# Patient Record
Sex: FEMALE | Race: WHITE | Employment: PART TIME | URBAN - METROPOLITAN AREA
[De-identification: names, ages, dates, MRNs, and addresses within clinical notes are randomized per-mention and may not be internally consistent; named-entity substitution may affect disease eponyms.]

---

## 2018-01-07 ENCOUNTER — HOSPITAL ENCOUNTER (OUTPATIENT)
Age: 33
Setting detail: OBSERVATION
Discharge: HOME | End: 2018-01-08
Attending: OBSTETRICS & GYNECOLOGY | Admitting: OBSTETRICS & GYNECOLOGY
Payer: COMMERCIAL

## 2018-01-07 ENCOUNTER — ANESTHESIA (OUTPATIENT)
Dept: SURGERY | Age: 33
End: 2018-01-07
Payer: COMMERCIAL

## 2018-01-07 ENCOUNTER — ANESTHESIA EVENT (OUTPATIENT)
Dept: SURGERY | Age: 33
End: 2018-01-07
Payer: COMMERCIAL

## 2018-01-07 DIAGNOSIS — Z98.890 STATUS POST LAPAROSCOPY: Primary | ICD-10-CM

## 2018-01-07 PROBLEM — D62 ACUTE BLOOD LOSS ANEMIA: Status: ACTIVE | Noted: 2018-01-07

## 2018-01-07 PROBLEM — O00.90 ECTOPIC PREGNANCY: Status: ACTIVE | Noted: 2018-01-07

## 2018-01-07 PROBLEM — K66.1 HEMOPERITONEUM: Status: ACTIVE | Noted: 2018-01-07

## 2018-01-07 LAB
BASOPHILS # BLD: 0 K/UL (ref 0–0.2)
BASOPHILS NFR BLD: 0 % (ref 0–2)
DIFFERENTIAL METHOD BLD: ABNORMAL
EOSINOPHIL # BLD: 0 K/UL (ref 0–0.8)
EOSINOPHIL NFR BLD: 0 % (ref 0.5–7.8)
ERYTHROCYTE [DISTWIDTH] IN BLOOD BY AUTOMATED COUNT: 13.7 % (ref 11.9–14.6)
ERYTHROCYTE [DISTWIDTH] IN BLOOD BY AUTOMATED COUNT: 14.3 % (ref 11.9–14.6)
HCG SERPL-ACNC: 1781 MIU/ML (ref 0–6)
HCG SERPL-ACNC: 2088 MIU/ML (ref 0–6)
HCG SERPL-ACNC: 3923 MIU/ML (ref 0–6)
HCT VFR BLD AUTO: 21.6 % (ref 35.8–46.3)
HCT VFR BLD AUTO: 26.1 % (ref 35.8–46.3)
HGB BLD-MCNC: 6.9 G/DL (ref 11.7–15.4)
HGB BLD-MCNC: 8.3 G/DL (ref 11.7–15.4)
HGB BLD-MCNC: 8.4 G/DL (ref 11.7–15.4)
IMM GRANULOCYTES # BLD: 0 K/UL (ref 0–0.5)
IMM GRANULOCYTES NFR BLD AUTO: 0 % (ref 0–5)
LYMPHOCYTES # BLD: 0.5 K/UL (ref 0.5–4.6)
LYMPHOCYTES NFR BLD: 4 % (ref 13–44)
MCH RBC QN AUTO: 26 PG (ref 26.1–32.9)
MCH RBC QN AUTO: 26.1 PG (ref 26.1–32.9)
MCHC RBC AUTO-ENTMCNC: 31.9 G/DL (ref 31.4–35)
MCHC RBC AUTO-ENTMCNC: 32.2 G/DL (ref 31.4–35)
MCV RBC AUTO: 80.8 FL (ref 79.6–97.8)
MCV RBC AUTO: 81.8 FL (ref 79.6–97.8)
MONOCYTES # BLD: 0.2 K/UL (ref 0.1–1.3)
MONOCYTES NFR BLD: 2 % (ref 4–12)
NEUTS SEG # BLD: 11.1 K/UL (ref 1.7–8.2)
NEUTS SEG NFR BLD: 94 % (ref 43–78)
PLATELET # BLD AUTO: 235 K/UL (ref 150–450)
PLATELET # BLD AUTO: 260 K/UL (ref 150–450)
PMV BLD AUTO: 9.4 FL (ref 10.8–14.1)
PMV BLD AUTO: 9.5 FL (ref 10.8–14.1)
RBC # BLD AUTO: 2.64 M/UL (ref 4.05–5.25)
RBC # BLD AUTO: 3.23 M/UL (ref 4.05–5.25)
WBC # BLD AUTO: 10.3 K/UL (ref 4.3–11.1)
WBC # BLD AUTO: 11.8 K/UL (ref 4.3–11.1)

## 2018-01-07 PROCEDURE — 74011000250 HC RX REV CODE- 250: Performed by: ANESTHESIOLOGY

## 2018-01-07 PROCEDURE — G0378 HOSPITAL OBSERVATION PER HR: HCPCS

## 2018-01-07 PROCEDURE — 85027 COMPLETE CBC AUTOMATED: CPT | Performed by: OBSTETRICS & GYNECOLOGY

## 2018-01-07 PROCEDURE — 74011250636 HC RX REV CODE- 250/636

## 2018-01-07 PROCEDURE — 77030011502 HC MANIP UTER ZUM ZINN -B: Performed by: OBSTETRICS & GYNECOLOGY

## 2018-01-07 PROCEDURE — 77030035029 HC NDL INSUF VERES DISP COVD -B: Performed by: OBSTETRICS & GYNECOLOGY

## 2018-01-07 PROCEDURE — 74011250636 HC RX REV CODE- 250/636: Performed by: ANESTHESIOLOGY

## 2018-01-07 PROCEDURE — 77030008703 HC TU ET UNCUF COVD -A: Performed by: ANESTHESIOLOGY

## 2018-01-07 PROCEDURE — 36415 COLL VENOUS BLD VENIPUNCTURE: CPT | Performed by: OBSTETRICS & GYNECOLOGY

## 2018-01-07 PROCEDURE — 76010000161 HC OR TIME 1 TO 1.5 HR INTENSV-TIER 1: Performed by: OBSTETRICS & GYNECOLOGY

## 2018-01-07 PROCEDURE — 94760 N-INVAS EAR/PLS OXIMETRY 1: CPT

## 2018-01-07 PROCEDURE — 77030034849: Performed by: OBSTETRICS & GYNECOLOGY

## 2018-01-07 PROCEDURE — 77030008522 HC TBNG INSUF LAPRO STRY -B: Performed by: OBSTETRICS & GYNECOLOGY

## 2018-01-07 PROCEDURE — 77030032490 HC SLV COMPR SCD KNE COVD -B

## 2018-01-07 PROCEDURE — 74011258636 HC RX REV CODE- 258/636: Performed by: OBSTETRICS & GYNECOLOGY

## 2018-01-07 PROCEDURE — 74011250637 HC RX REV CODE- 250/637: Performed by: OBSTETRICS & GYNECOLOGY

## 2018-01-07 PROCEDURE — P9016 RBC LEUKOCYTES REDUCED: HCPCS | Performed by: ANESTHESIOLOGY

## 2018-01-07 PROCEDURE — 36430 TRANSFUSION BLD/BLD COMPNT: CPT

## 2018-01-07 PROCEDURE — 77030019940 HC BLNKT HYPOTHRM STRY -B: Performed by: ANESTHESIOLOGY

## 2018-01-07 PROCEDURE — 85018 HEMOGLOBIN: CPT | Performed by: ANESTHESIOLOGY

## 2018-01-07 PROCEDURE — 77030010507 HC ADH SKN DERMBND J&J -B: Performed by: OBSTETRICS & GYNECOLOGY

## 2018-01-07 PROCEDURE — 86901 BLOOD TYPING SEROLOGIC RH(D): CPT | Performed by: ANESTHESIOLOGY

## 2018-01-07 PROCEDURE — 77030008756 HC TU IRR SUC STRY -B: Performed by: OBSTETRICS & GYNECOLOGY

## 2018-01-07 PROCEDURE — 77030020407 HC IV BLD WRMR ST 3M -A: Performed by: ANESTHESIOLOGY

## 2018-01-07 PROCEDURE — 84702 CHORIONIC GONADOTROPIN TEST: CPT | Performed by: OBSTETRICS & GYNECOLOGY

## 2018-01-07 PROCEDURE — 99218 HC RM OBSERVATION: CPT

## 2018-01-07 PROCEDURE — 75810000275 HC EMERGENCY DEPT VISIT NO LEVEL OF CARE: Performed by: EMERGENCY MEDICINE

## 2018-01-07 PROCEDURE — 74011000250 HC RX REV CODE- 250

## 2018-01-07 PROCEDURE — 77030032490 HC SLV COMPR SCD KNE COVD -B: Performed by: OBSTETRICS & GYNECOLOGY

## 2018-01-07 PROCEDURE — 77030011640 HC PAD GRND REM COVD -A: Performed by: OBSTETRICS & GYNECOLOGY

## 2018-01-07 PROCEDURE — 77010033678 HC OXYGEN DAILY

## 2018-01-07 PROCEDURE — 74011250636 HC RX REV CODE- 250/636: Performed by: OBSTETRICS & GYNECOLOGY

## 2018-01-07 PROCEDURE — 96361 HYDRATE IV INFUSION ADD-ON: CPT | Performed by: EMERGENCY MEDICINE

## 2018-01-07 PROCEDURE — 99285 EMERGENCY DEPT VISIT HI MDM: CPT | Performed by: EMERGENCY MEDICINE

## 2018-01-07 PROCEDURE — 77030018836 HC SOL IRR NACL ICUM -A: Performed by: OBSTETRICS & GYNECOLOGY

## 2018-01-07 PROCEDURE — 77030013131 HC IV BLD ST ICUM -A

## 2018-01-07 PROCEDURE — 76060000034 HC ANESTHESIA 1.5 TO 2 HR: Performed by: OBSTETRICS & GYNECOLOGY

## 2018-01-07 PROCEDURE — 86923 COMPATIBILITY TEST ELECTRIC: CPT | Performed by: ANESTHESIOLOGY

## 2018-01-07 PROCEDURE — 77030008477 HC STYL SATN SLP COVD -A: Performed by: ANESTHESIOLOGY

## 2018-01-07 PROCEDURE — 85025 COMPLETE CBC W/AUTO DIFF WBC: CPT | Performed by: ANESTHESIOLOGY

## 2018-01-07 PROCEDURE — 96374 THER/PROPH/DIAG INJ IV PUSH: CPT | Performed by: EMERGENCY MEDICINE

## 2018-01-07 PROCEDURE — 76210000016 HC OR PH I REC 1 TO 1.5 HR: Performed by: OBSTETRICS & GYNECOLOGY

## 2018-01-07 PROCEDURE — 77030035044 HC TRCR ENDOSC VRSPRT BLDLSS COVD -C: Performed by: OBSTETRICS & GYNECOLOGY

## 2018-01-07 PROCEDURE — 77030010351 HC TRCR ENDOSC VSTP COVD -B: Performed by: OBSTETRICS & GYNECOLOGY

## 2018-01-07 RX ORDER — DEXTROSE, SODIUM CHLORIDE, SODIUM LACTATE, POTASSIUM CHLORIDE, AND CALCIUM CHLORIDE 5; .6; .31; .03; .02 G/100ML; G/100ML; G/100ML; G/100ML; G/100ML
125 INJECTION, SOLUTION INTRAVENOUS CONTINUOUS
Status: DISCONTINUED | OUTPATIENT
Start: 2018-01-07 | End: 2018-01-07

## 2018-01-07 RX ORDER — HYDROMORPHONE HYDROCHLORIDE 2 MG/ML
0.5 INJECTION, SOLUTION INTRAMUSCULAR; INTRAVENOUS; SUBCUTANEOUS
Status: DISCONTINUED | OUTPATIENT
Start: 2018-01-07 | End: 2018-01-07 | Stop reason: HOSPADM

## 2018-01-07 RX ORDER — NEOSTIGMINE METHYLSULFATE 1 MG/ML
INJECTION INTRAVENOUS AS NEEDED
Status: DISCONTINUED | OUTPATIENT
Start: 2018-01-07 | End: 2018-01-07 | Stop reason: HOSPADM

## 2018-01-07 RX ORDER — DEXTROSE, SODIUM CHLORIDE, SODIUM LACTATE, POTASSIUM CHLORIDE, AND CALCIUM CHLORIDE 5; .6; .31; .03; .02 G/100ML; G/100ML; G/100ML; G/100ML; G/100ML
75 INJECTION, SOLUTION INTRAVENOUS CONTINUOUS
Status: DISCONTINUED | OUTPATIENT
Start: 2018-01-07 | End: 2018-01-08 | Stop reason: HOSPADM

## 2018-01-07 RX ORDER — SODIUM CHLORIDE 9 MG/ML
INJECTION, SOLUTION INTRAVENOUS
Status: DISCONTINUED | OUTPATIENT
Start: 2018-01-07 | End: 2018-01-07 | Stop reason: HOSPADM

## 2018-01-07 RX ORDER — FAMOTIDINE 10 MG/ML
INJECTION INTRAVENOUS
Status: DISCONTINUED
Start: 2018-01-07 | End: 2018-01-07

## 2018-01-07 RX ORDER — ONDANSETRON 2 MG/ML
4 INJECTION INTRAMUSCULAR; INTRAVENOUS
Status: DISCONTINUED | OUTPATIENT
Start: 2018-01-07 | End: 2018-01-08 | Stop reason: HOSPADM

## 2018-01-07 RX ORDER — ROCURONIUM BROMIDE 10 MG/ML
INJECTION, SOLUTION INTRAVENOUS AS NEEDED
Status: DISCONTINUED | OUTPATIENT
Start: 2018-01-07 | End: 2018-01-07 | Stop reason: HOSPADM

## 2018-01-07 RX ORDER — SODIUM CHLORIDE 9 MG/ML
250 INJECTION, SOLUTION INTRAVENOUS AS NEEDED
Status: DISCONTINUED | OUTPATIENT
Start: 2018-01-07 | End: 2018-01-08 | Stop reason: HOSPADM

## 2018-01-07 RX ORDER — OXYCODONE HYDROCHLORIDE 5 MG/1
5 TABLET ORAL
Status: DISCONTINUED | OUTPATIENT
Start: 2018-01-07 | End: 2018-01-07 | Stop reason: HOSPADM

## 2018-01-07 RX ORDER — SODIUM CHLORIDE 0.9 % (FLUSH) 0.9 %
5-10 SYRINGE (ML) INJECTION EVERY 8 HOURS
Status: DISCONTINUED | OUTPATIENT
Start: 2018-01-07 | End: 2018-01-08 | Stop reason: HOSPADM

## 2018-01-07 RX ORDER — DEXAMETHASONE SODIUM PHOSPHATE 4 MG/ML
INJECTION, SOLUTION INTRA-ARTICULAR; INTRALESIONAL; INTRAMUSCULAR; INTRAVENOUS; SOFT TISSUE AS NEEDED
Status: DISCONTINUED | OUTPATIENT
Start: 2018-01-07 | End: 2018-01-07 | Stop reason: HOSPADM

## 2018-01-07 RX ORDER — PROPOFOL 10 MG/ML
INJECTION, EMULSION INTRAVENOUS AS NEEDED
Status: DISCONTINUED | OUTPATIENT
Start: 2018-01-07 | End: 2018-01-07 | Stop reason: HOSPADM

## 2018-01-07 RX ORDER — SODIUM CHLORIDE 9 MG/ML
250 INJECTION, SOLUTION INTRAVENOUS AS NEEDED
Status: DISCONTINUED | OUTPATIENT
Start: 2018-01-07 | End: 2018-01-07

## 2018-01-07 RX ORDER — SODIUM CHLORIDE, SODIUM LACTATE, POTASSIUM CHLORIDE, CALCIUM CHLORIDE 600; 310; 30; 20 MG/100ML; MG/100ML; MG/100ML; MG/100ML
125 INJECTION, SOLUTION INTRAVENOUS CONTINUOUS
Status: DISCONTINUED | OUTPATIENT
Start: 2018-01-07 | End: 2018-01-07

## 2018-01-07 RX ORDER — HYDROMORPHONE HYDROCHLORIDE 2 MG/ML
1 INJECTION, SOLUTION INTRAMUSCULAR; INTRAVENOUS; SUBCUTANEOUS
Status: DISCONTINUED | OUTPATIENT
Start: 2018-01-07 | End: 2018-01-08 | Stop reason: HOSPADM

## 2018-01-07 RX ORDER — IBUPROFEN 400 MG/1
800 TABLET ORAL
Status: DISCONTINUED | OUTPATIENT
Start: 2018-01-07 | End: 2018-01-08 | Stop reason: HOSPADM

## 2018-01-07 RX ORDER — NALOXONE HYDROCHLORIDE 0.4 MG/ML
0.4 INJECTION, SOLUTION INTRAMUSCULAR; INTRAVENOUS; SUBCUTANEOUS AS NEEDED
Status: DISCONTINUED | OUTPATIENT
Start: 2018-01-07 | End: 2018-01-08 | Stop reason: HOSPADM

## 2018-01-07 RX ORDER — SODIUM CHLORIDE 0.9 % (FLUSH) 0.9 %
5-10 SYRINGE (ML) INJECTION AS NEEDED
Status: DISCONTINUED | OUTPATIENT
Start: 2018-01-07 | End: 2018-01-08 | Stop reason: HOSPADM

## 2018-01-07 RX ORDER — FENTANYL CITRATE 50 UG/ML
INJECTION, SOLUTION INTRAMUSCULAR; INTRAVENOUS AS NEEDED
Status: DISCONTINUED | OUTPATIENT
Start: 2018-01-07 | End: 2018-01-07 | Stop reason: HOSPADM

## 2018-01-07 RX ORDER — FAMOTIDINE 10 MG/ML
20 INJECTION INTRAVENOUS
Status: COMPLETED | OUTPATIENT
Start: 2018-01-07 | End: 2018-01-07

## 2018-01-07 RX ORDER — DIPHENHYDRAMINE HCL 25 MG
50 CAPSULE ORAL
Status: COMPLETED | OUTPATIENT
Start: 2018-01-07 | End: 2018-01-07

## 2018-01-07 RX ORDER — GLYCOPYRROLATE 0.2 MG/ML
INJECTION INTRAMUSCULAR; INTRAVENOUS AS NEEDED
Status: DISCONTINUED | OUTPATIENT
Start: 2018-01-07 | End: 2018-01-07 | Stop reason: HOSPADM

## 2018-01-07 RX ORDER — LIDOCAINE HYDROCHLORIDE 20 MG/ML
INJECTION, SOLUTION EPIDURAL; INFILTRATION; INTRACAUDAL; PERINEURAL AS NEEDED
Status: DISCONTINUED | OUTPATIENT
Start: 2018-01-07 | End: 2018-01-07 | Stop reason: HOSPADM

## 2018-01-07 RX ORDER — OXYCODONE HYDROCHLORIDE 5 MG/1
10 TABLET ORAL
Status: DISCONTINUED | OUTPATIENT
Start: 2018-01-07 | End: 2018-01-07 | Stop reason: HOSPADM

## 2018-01-07 RX ORDER — SODIUM CHLORIDE, SODIUM LACTATE, POTASSIUM CHLORIDE, CALCIUM CHLORIDE 600; 310; 30; 20 MG/100ML; MG/100ML; MG/100ML; MG/100ML
1000 INJECTION, SOLUTION INTRAVENOUS ONCE
Status: COMPLETED | OUTPATIENT
Start: 2018-01-07 | End: 2018-01-07

## 2018-01-07 RX ORDER — ONDANSETRON 2 MG/ML
INJECTION INTRAMUSCULAR; INTRAVENOUS AS NEEDED
Status: DISCONTINUED | OUTPATIENT
Start: 2018-01-07 | End: 2018-01-07 | Stop reason: HOSPADM

## 2018-01-07 RX ORDER — ACETAMINOPHEN 500 MG
1000 TABLET ORAL ONCE
Status: COMPLETED | OUTPATIENT
Start: 2018-01-07 | End: 2018-01-07

## 2018-01-07 RX ORDER — SODIUM CHLORIDE, SODIUM LACTATE, POTASSIUM CHLORIDE, CALCIUM CHLORIDE 600; 310; 30; 20 MG/100ML; MG/100ML; MG/100ML; MG/100ML
INJECTION, SOLUTION INTRAVENOUS
Status: DISCONTINUED | OUTPATIENT
Start: 2018-01-07 | End: 2018-01-07

## 2018-01-07 RX ORDER — SODIUM CHLORIDE, SODIUM LACTATE, POTASSIUM CHLORIDE, CALCIUM CHLORIDE 600; 310; 30; 20 MG/100ML; MG/100ML; MG/100ML; MG/100ML
INJECTION, SOLUTION INTRAVENOUS
Status: DISCONTINUED | OUTPATIENT
Start: 2018-01-07 | End: 2018-01-07 | Stop reason: HOSPADM

## 2018-01-07 RX ORDER — SODIUM CHLORIDE, SODIUM LACTATE, POTASSIUM CHLORIDE, CALCIUM CHLORIDE 600; 310; 30; 20 MG/100ML; MG/100ML; MG/100ML; MG/100ML
75 INJECTION, SOLUTION INTRAVENOUS CONTINUOUS
Status: DISCONTINUED | OUTPATIENT
Start: 2018-01-07 | End: 2018-01-07 | Stop reason: HOSPADM

## 2018-01-07 RX ORDER — FAMOTIDINE 10 MG/ML
20 INJECTION INTRAVENOUS
Status: DISCONTINUED | OUTPATIENT
Start: 2018-01-07 | End: 2018-01-07

## 2018-01-07 RX ORDER — HYDROCODONE BITARTRATE AND ACETAMINOPHEN 7.5; 325 MG/1; MG/1
1-2 TABLET ORAL
Status: DISCONTINUED | OUTPATIENT
Start: 2018-01-07 | End: 2018-01-08 | Stop reason: HOSPADM

## 2018-01-07 RX ADMIN — SODIUM CHLORIDE, SODIUM LACTATE, POTASSIUM CHLORIDE, AND CALCIUM CHLORIDE 1000 ML: 600; 310; 30; 20 INJECTION, SOLUTION INTRAVENOUS at 01:08

## 2018-01-07 RX ADMIN — Medication 5 ML: at 05:57

## 2018-01-07 RX ADMIN — SODIUM CHLORIDE, SODIUM LACTATE, POTASSIUM CHLORIDE, CALCIUM CHLORIDE, AND DEXTROSE MONOHYDRATE 125 ML/HR: 600; 310; 30; 20; 5 INJECTION, SOLUTION INTRAVENOUS at 05:57

## 2018-01-07 RX ADMIN — ROCURONIUM BROMIDE 10 MG: 10 INJECTION, SOLUTION INTRAVENOUS at 02:14

## 2018-01-07 RX ADMIN — DEXAMETHASONE SODIUM PHOSPHATE 8 MG: 4 INJECTION, SOLUTION INTRA-ARTICULAR; INTRALESIONAL; INTRAMUSCULAR; INTRAVENOUS; SOFT TISSUE at 01:45

## 2018-01-07 RX ADMIN — GLYCOPYRROLATE 0.4 MG: 0.2 INJECTION INTRAMUSCULAR; INTRAVENOUS at 02:32

## 2018-01-07 RX ADMIN — ACETAMINOPHEN 1000 MG: 500 TABLET, FILM COATED ORAL at 22:37

## 2018-01-07 RX ADMIN — ROCURONIUM BROMIDE 5 MG: 10 INJECTION, SOLUTION INTRAVENOUS at 02:00

## 2018-01-07 RX ADMIN — HYDROCODONE BITARTRATE AND ACETAMINOPHEN 1 TABLET: 7.5; 325 TABLET ORAL at 21:43

## 2018-01-07 RX ADMIN — FENTANYL CITRATE 50 MCG: 50 INJECTION, SOLUTION INTRAMUSCULAR; INTRAVENOUS at 01:25

## 2018-01-07 RX ADMIN — NEOSTIGMINE METHYLSULFATE 2 MG: 1 INJECTION INTRAVENOUS at 02:32

## 2018-01-07 RX ADMIN — SODIUM CHLORIDE: 9 INJECTION, SOLUTION INTRAVENOUS at 01:18

## 2018-01-07 RX ADMIN — FAMOTIDINE 20 MG: 10 INJECTION, SOLUTION INTRAVENOUS at 00:53

## 2018-01-07 RX ADMIN — HYDROMORPHONE HYDROCHLORIDE 0.5 MG: 2 INJECTION, SOLUTION INTRAMUSCULAR; INTRAVENOUS; SUBCUTANEOUS at 03:26

## 2018-01-07 RX ADMIN — LIDOCAINE HYDROCHLORIDE 100 MG: 20 INJECTION, SOLUTION EPIDURAL; INFILTRATION; INTRACAUDAL; PERINEURAL at 01:25

## 2018-01-07 RX ADMIN — FENTANYL CITRATE 50 MCG: 50 INJECTION, SOLUTION INTRAMUSCULAR; INTRAVENOUS at 02:30

## 2018-01-07 RX ADMIN — ROCURONIUM BROMIDE 15 MG: 10 INJECTION, SOLUTION INTRAVENOUS at 01:34

## 2018-01-07 RX ADMIN — ROCURONIUM BROMIDE 5 MG: 10 INJECTION, SOLUTION INTRAVENOUS at 01:25

## 2018-01-07 RX ADMIN — ONDANSETRON 4 MG: 2 INJECTION INTRAMUSCULAR; INTRAVENOUS at 01:51

## 2018-01-07 RX ADMIN — ONDANSETRON 4 MG: 2 INJECTION INTRAMUSCULAR; INTRAVENOUS at 08:44

## 2018-01-07 RX ADMIN — HYDROCODONE BITARTRATE AND ACETAMINOPHEN 1 TABLET: 7.5; 325 TABLET ORAL at 15:52

## 2018-01-07 RX ADMIN — FENTANYL CITRATE 50 MCG: 50 INJECTION, SOLUTION INTRAMUSCULAR; INTRAVENOUS at 01:34

## 2018-01-07 RX ADMIN — PROPOFOL 150 MG: 10 INJECTION, EMULSION INTRAVENOUS at 01:25

## 2018-01-07 RX ADMIN — HYDROMORPHONE HYDROCHLORIDE 1 MG: 2 INJECTION, SOLUTION INTRAMUSCULAR; INTRAVENOUS; SUBCUTANEOUS at 08:44

## 2018-01-07 RX ADMIN — FENTANYL CITRATE 50 MCG: 50 INJECTION, SOLUTION INTRAMUSCULAR; INTRAVENOUS at 02:46

## 2018-01-07 RX ADMIN — DIPHENHYDRAMINE HYDROCHLORIDE 50 MG: 25 CAPSULE ORAL at 22:38

## 2018-01-07 RX ADMIN — SODIUM CHLORIDE, SODIUM LACTATE, POTASSIUM CHLORIDE, CALCIUM CHLORIDE, AND DEXTROSE MONOHYDRATE 125 ML/HR: 600; 310; 30; 20; 5 INJECTION, SOLUTION INTRAVENOUS at 15:52

## 2018-01-07 RX ADMIN — SODIUM CHLORIDE, SODIUM LACTATE, POTASSIUM CHLORIDE, CALCIUM CHLORIDE: 600; 310; 30; 20 INJECTION, SOLUTION INTRAVENOUS at 01:45

## 2018-01-07 RX ADMIN — SODIUM CHLORIDE, SODIUM LACTATE, POTASSIUM CHLORIDE, AND CALCIUM CHLORIDE: 600; 310; 30; 20 INJECTION, SOLUTION INTRAVENOUS at 01:18

## 2018-01-07 NOTE — BRIEF OP NOTE
BRIEF OPERATIVE NOTE    Date of Procedure: 1/7/2018   Preoperative Diagnosis: Tubal ectopic pregnancy, unspecified laterality, unspecified whether intrauterine pregnancy present [O00.109]  Postoperative Diagnosis: Tubal ectopic pregnancy, unspecified laterality, unspecified whether intrauterine pregnancy present [O00.109]    Procedure(s):  DIAGNOSTIC LAPAROSCOPY FOR REMOVAL OF CLOT    Surgeon(s) and Role:      * Yari Huang MD - Primary         Assistant Staff:none  intraop consult with OB hospitalist       Surgical Staff:  Circ-1: Karan Butler RN  Scrub Tech-1: Tiara Orona  Scrub Tech-2: Erica Early  Event Time In   Incision Start 7497   Incision Close 0237     Anesthesia: General   Estimated Blood Loss: 1200cc blood in abdomen  Specimens: * No specimens in log *   Findings: large hemoperitoneum, surgically absent L tube, no obvious tubal or ovarian pregnancy   Complications: uncertain location of ectopic  Implants: * No implants in log *

## 2018-01-07 NOTE — PROGRESS NOTES
Resting comfortably. Only has some abd pain when takes a deep breath. Requests water. VS  At least 200cc clear urine in france bag  Abdomen soft, NT, ND  hgb stable at 8.3  Quant here 3923    Continue to watch closely.  Matti quant later today

## 2018-01-07 NOTE — ANESTHESIA PREPROCEDURE EVALUATION
Anesthetic History     PONV          Review of Systems / Medical History  Patient summary reviewed and pertinent labs reviewed    Pulmonary                   Neuro/Psych              Cardiovascular                  Exercise tolerance: >4 METS     GI/Hepatic/Renal                Endo/Other        Anemia     Other Findings   Comments: Ruptured ectopic           Physical Exam    Airway  Mallampati: I  TM Distance: > 6 cm  Neck ROM: normal range of motion   Mouth opening: Normal     Cardiovascular  Regular rate and rhythm,  S1 and S2 normal,  no murmur, click, rub, or gallop    Rate: normal         Dental  No notable dental hx       Pulmonary  Breath sounds clear to auscultation               Abdominal         Other Findings            Anesthetic Plan    ASA: 2, emergent  Anesthesia type: general            Anesthetic plan and risks discussed with: Patient and Spouse

## 2018-01-07 NOTE — PERIOP NOTES
TRANSFER - OUT REPORT:    Verbal report given to Xiao RN(name) on Edwin Hassan  being transferred to Room 339(unit) for routine post - op       Report consisted of patients Situation, Background, Assessment and   Recommendations(SBAR). Information from the following report(s) SBAR, Kardex, OR Summary, Procedure Summary, Intake/Output and MAR was reviewed with the receiving nurse. Opportunity for questions and clarification was provided.       Patient transported with:   O2 @ 3 liters  Tech

## 2018-01-07 NOTE — PERIOP NOTES
TRANSFER - IN REPORT:    Verbal report received from Gordon Pepe RN on Jed Phillips  being received from ER for urgent transfer      Report consisted of patients Situation, Background, Assessment and   Recommendations(SBAR). Information from the following report(s) SBAR, ED Summary, Intake/Output and MAR was reviewed with the receiving nurse. Opportunity for questions and clarification was provided. Assessment completed upon patients arrival to unit and care assumed.      PATIENT WAS STABLE WITH BP /64

## 2018-01-07 NOTE — OP NOTES
DIAGNOSTIC LAPAROSCOPY      Lucio Scheuermann  [id number]  1/7/2018        PRE-OP DX: early pregnancy with R adnexal mass, acute abdomen    POST-OP DX:  Hemoperitoneum, acute blood loss anemia, no active bleeding, uncertain location of ectopic    PROCEDURE:  DIAGNOSTIC LAPAROSCOPY and evacuation of hemoperitoneum      SURGEON:  Darling    EBL:   5cc from laparoscopy. 1200cc hemoperitoneum present    SPECIMEN:none      PROCEDURE:    The patient was placed on the operating room table in the supine position. Time out was done to confirm operative procedure, surgeon, patient and site. Once this had been confirmed the procedure was started. The patient was placed under general endotracheal anesthesia repositioned in the dorsal lithotomy position prepped and draped in the usual sterile fashion for vaginal/laparoscopic surgery. On EUA, the uterus was retroverted. No adnexal masses palpated. A weighted speculum was placed in the vagina and the cervix was visualized. The cervix was grasped with a single tooth tennaculum, momin cannula was inserted into the cervix and attached to the tennaculum. This was used  for uterine manipulation. A subumbilical incision was made. A verses needle was inserted, opening pressure of 3mm Hg noted,  and the abdomen was filled with 2 liters of CO2. A 5mm trocar was inserted through the incision followed by introduction of the laparoscope. Under direct laparoscopic vision a 10mm trocar was placed lateral to the rectus muscle on the patients left side. The same was done with a 5mm trocar on the right side. Patient was placed in Trendelenburg. Large amount clot and fresh blood noted in the pelvis and upper abdomen. Large suction  was placed through the 10mm port and used to suction as much blood/clot as possible away. The uterus appeared normal. Left tube surgically absent.  Left ovary normal. Small filmy adhesion of left ovary to pelvic side wall. No ectopic evident on the left ovary. Organized clot in the pelvis was concentrated between the right tube and ovary. But no mass c/w ectopic was seen here. There was no distension of the right tube. Atraumatic grasper was placed in the R lateral port to manipulate and elevate the right tube and ovary, to look at all aspects of the adnexa. Boudreaux Moose was replaced with HUMI to enable better manipulation and visualization. No ectopic was seen. Both ovaries were inspected several times. No uterine cornual distension noted either. Smaller tip was placed on the suction , and as much blood as possible was suctioned from the right upper quadrant. Patient was placed in reverse trendelenburg to help with this. Once back in trendelenburg, some dark blood was still present in the left upper quandrant. This was observed several times over the last 15 minutes of the case, and was not increasing. Dr Barbara Soler was asked to consult intraop. She also visualized the pelvis and helped evaluate the stability of the blood present in the left upper quadrant. The omentum and bowel were visualized several times as well. No masses or bleeding areas were seen. At this point, I broke scrub to examine the clots and material in the sock of the suction canister. No material was seen suggestive of the pregnancy. New sterile gloves were donned. Scope was replaced in the abdomen. There was no bleeding in the pelvis at all. The blood in the left upper quadrant was the same in amount as prior. Dr Abdi Moore also felt this was the case. The decision was made to not remove what looked like a normal tube in this patient who desires fertility. The 5mm and 10mm lateral trocars were removed under direct laparoscopic vision and Hemostasis was insured. All counts were correct. CO2 was allowed to escape. Umbilical port was removed.   The trocar incision sites were closed with 4-0 Vicryl and mini pressure dressing were applied to each trocar site.     The Rigo Doctor was removed from the cervix, hemostasis was ensured. The patient tolerated the procedure well was awaken from GETA and sent to the PACU in stable condition. She is being admitted to med-surg to watch carefully and follow VSCristina, scarlettb's.

## 2018-01-07 NOTE — PROGRESS NOTES
Patient alert, oriented with family at bedside. Nursing assessment completed. Bed in low and locked position. Patient instructed to call for assistance, if needed. No acute distress noted. SCD's on and IV fluids infusing as ordered.

## 2018-01-07 NOTE — IP AVS SNAPSHOT
[address] 
[phone] Patient: Gilberto Hunter MRN: [id number] :1985 About your hospitalization You were admitted on:  2018 You last received care in the:  [facility] You were discharged on:  2018 Why you were hospitalized Your primary diagnosis was:  Ectopic Pregnancy Your diagnoses also included:  Acute Blood Loss Anemia, Hemoperitoneum Follow-up Information Follow up With Details Comments Contact Info MD Kimmy Benavides [place] [address] [address] 
[phone] Antwan Fields MD On 2018 1:30pm [address] [address] 
[phone] Your Scheduled Appointments   1:30 PM EST Global Post Op with Antwan Fields MD  
[facility] ([facility]) [address] [id number] [phone] 2018 10:30 AM EST  
[facility]ician with Antwan Fields MD, [facility]) [address] [address] [phone] Discharge Orders None A check kamaljit indicates which time of day the medication should be taken. My Medications START taking these medications Instructions Each Dose to Equal  
 Morning Noon Evening Bedtime HYDROcodone-acetaminophen 7.5-325 mg per tablet Commonly known as:  [place] Take 1-2 Tabs by mouth every six (6) hours as needed. Max Daily Amount: 8 Tabs. 1-2 Tab  
    
   
   
   
  
 ibuprofen 800 mg tablet Commonly known as:  MOTRIN Take 1 Tab by mouth every eight (8) hours as needed. 800 mg CONTINUE taking these medications Instructions Each Dose to Equal  
 Morning Noon Evening Bedtime terconazole 0.4 % vaginal cream  
Commonly known as:  TERAZOL 7 Your next dose is:  TODAY Insert 1 Applicator into vagina nightly. 1 Applicator Where to Get Your Medications These medications were sent to [facility]/pharmacy #[id number]- [address], [address] [facility] [address] [place]  [address], [address] [place] [address] Phone:  [phone]  
  ibuprofen 800 mg tablet Information on where to get these meds will be given to you by the nurse or doctor. ! Ask your nurse or doctor about these medications HYDROcodone-acetaminophen 7.5-325 mg per tablet Discharge Instructions DISCHARGE SUMMARY from Nurse The following personal items are in your possession at time of discharge: 
 
Dental Appliances: None Visual Aid: None Home Medications: None Jewelry: Dorita Woodrowuel Clothing: Pants, Shirt, Undergarments, Jacket/Coat Other Valuables: Cell Phone PATIENT INSTRUCTIONS: 
 
After general anesthesia or intravenous sedation, for 24 hours or while taking prescription Narcotics: · Limit your activities · Do not drive and operate hazardous machinery · Do not make important personal or business decisions · Do  not drink alcoholic beverages · If you have not urinated within 8 hours after discharge, please contact your surgeon on call. Report the following to your surgeon: 
· Excessive pain, swelling, redness or odor of or around the surgical area · Temperature over 100.5 · Nausea and vomiting lasting longer than 4 hours or if unable to take medications · Any signs of decreased circulation or nerve impairment to extremity: change in color, persistent  numbness, tingling, coldness or increase pain · Any questions What to do at Home: 
Recommended activity: Activity as tolerated, per MD 
 
If you experience any of the following symptoms fever>101, pain unrelieved with medication, nausea/vomiting, shortness of breath, dizziness/fainting, chest pain. , please follow up with your doctor. *  Please give a list of your current medications to your Primary Care Provider. *  Please update this list whenever your medications are discontinued, doses are 
    changed, or new medications (including over-the-counter products) are added. *  Please carry medication information at all times in case of emergency situations. These are general instructions for a healthy lifestyle: No smoking/ No tobacco products/ Avoid exposure to second hand smoke Surgeon General's Warning:  Quitting smoking now greatly reduces serious risk to your health. Obesity, smoking, and sedentary lifestyle greatly increases your risk for illness A healthy diet, regular physical exercise & weight monitoring are important for maintaining a healthy lifestyle You may be retaining fluid if you have a history of heart failure or if you experience any of the following symptoms:  Weight gain of 3 pounds or more overnight or 5 pounds in a week, increased swelling in our hands or feet or shortness of breath while lying flat in bed. Please call your doctor as soon as you notice any of these symptoms; do not wait until your next office visit. Recognize signs and symptoms of STROKE: 
 
F-face looks uneven A-arms unable to move or move unevenly S-speech slurred or non-existent T-time-call 911 as soon as signs and symptoms begin-DO NOT go Back to bed or wait to see if you get better-TIME IS BRAIN. Warning Signs of HEART ATTACK Call 911 if you have these symptoms: 
? Chest discomfort. Most heart attacks involve discomfort in the center of the chest that lasts more than a few minutes, or that goes away and comes back. It can feel like uncomfortable pressure, squeezing, fullness, or pain. ? Discomfort in other areas of the upper body.  Symptoms can include pain or discomfort in one or both arms, the back, neck, jaw, or stomach. ? Shortness of breath with or without chest discomfort. ? Other signs may include breaking out in a cold sweat, nausea, or lightheadedness. Don't wait more than five minutes to call [address]! Fast action can save your life. Calling 911 is almost always the fastest way to get lifesaving treatment. Emergency Medical Services staff can begin treatment when they arrive  up to an hour sooner than if someone gets to the hospital by car. The discharge information has been reviewed with the patient. The patient verbalized understanding. Discharge medications reviewed with the patient and appropriate educational materials and side effects teaching were provided. Ectopic Pregnancy: Care Instructions Your Care Instructions An ectopic pregnancy occurs when a fertilized egg grows outside of the uterus. In a normal pregnancy, the fertilized egg grows inside the uterus. In most ectopic pregnancies, the egg grows in a fallopian tube. This is also called a tubal pregnancy. Sometimes the egg grows in an ovary or another place in the belly. But this is rare. An ectopic pregnancy never becomes a normal pregnancy and birth. You had treatment to end your ectopic pregnancy. This was done to prevent dangerous problems. You may need a few weeks to recover if you had surgery. You should be able to have a normal pregnancy in the future. But you may have a higher risk for more ectopic pregnancies. Tell your doctor right away if you get pregnant again. Follow-up care is a key part of your treatment and safety. Be sure to make and go to all appointments, and call your doctor if you are having problems. It's also a good idea to know your test results and keep a list of the medicines you take. How can you care for yourself at home?  
· After your treatment, you may have vaginal bleeding that's similar to a period. It may last for up to a week. Use pads instead of tampons. You may use tampons during your next period. It should start in 3 to 6 weeks. · Do not have sex until after the bleeding stops. · If you are treated with methotrexate: 
¨ Your doctor will let you know if you can take over-the-counter pain medicine, such as acetaminophen (Tylenol), ibuprofen (Advil, Motrin), or naproxen (Aleve). Read and follow all instructions on the label. ¨ Do not take two or more pain medicines at the same time unless the doctor told you to. Many pain medicines have acetaminophen, which is Tylenol. Too much acetaminophen (Tylenol) can be harmful. ¨ Do not drink alcohol. ¨ Do not take vitamins that contain folic acid, such as prenatal vitamins. · Get plenty of rest. You may be more tired than normal for a few weeks. · Take it easy and avoid lifting until your doctor tells you it is safe to do your normal activities. · Give yourself and your partner time to grieve. You may have feelings of loss. You may wonder why it happened and blame yourself. ¨ Talking to family members, friends, or a counselor may help you cope with your loss. ¨ If you feel sad for longer than 2 weeks, tell your doctor or a counselor. · Talk to your doctor if you are worried about having children in the future. Most doctors suggest waiting until you have had at least one normal period before you try to get pregnant. · If you do not want to get pregnant, ask your doctor about birth control. You can get pregnant again before your next period starts. When should you call for help? Call 911 anytime you think you may need emergency care. For example, call if: 
· You passed out (lost consciousness). Call your doctor now or seek immediate medical care if: 
· You have severe vaginal bleeding. · You are dizzy or lightheaded, or you feel like you may faint. · You have a fever. · You have new or worse pain in your belly or pelvis. · You have vaginal discharge that smells bad. Watch closely for changes in your health, and be sure to contact your doctor if: 
· You do not get better as expected. Where can you learn more? Go to http://roshan-madhuri.info/. Enter J774 in the search box to learn more about \"Ectopic Pregnancy: Care Instructions. \" Current as of: March 16, 2017 Content Version: 11.4 © 1088-9689 [employer]. Care instructions adapted under license by [employer] (which disclaims liability or warranty for this information). If you have questions about a medical condition or this instruction, always ask your healthcare professional. [address] any warranty or liability for your use of this information. [employer] Announcement We are excited to announce that we are making your provider's discharge notes available to you in BloomBoard. You will see these notes when they are completed and signed by the physician that discharged you from your recent hospital stay. If you have any questions or concerns about any information you see in BloomBoard, please call the Health Information Department where you were seen or reach out to your Primary Care Provider for more information about your plan of care. Introducing [facility]! [employer] introduces [employer]Board patient portal. Now you can access parts of your medical record, email your doctor's office, and request medication refills online. 1. In your internet browser, go to [email or web address] 2. Click on the First Time User? Click Here link in the Sign In box. You will see the New Member Sign Up page. 3. Enter your BloomBoard Access Code exactly as it appears below. You will not need to use this code after youve completed the sign-up process. If you do not sign up before the expiration date, you must request a new code.  
 
· BloomBoard Access Code: [email or web address] 
 Expires: 2/12/2018  2:24 PM 
 
4. Enter the last four digits of your Social Security Number (xxxx) and Date of Birth (mm/dd/yyyy) as indicated and click Submit. You will be taken to the next sign-up page. 5. Create a This Week Int ID. This will be your [employer] login ID and cannot be changed, so think of one that is secure and easy to remember. 6. Create a [employer] password. You can change your password at any time. 7. Enter your Password Reset Question and Answer. This can be used at a later time if you forget your password. 8. Enter your e-mail address. You will receive e-mail notification when new information is available in [address] 9. Click Sign Up. You can now view and download portions of your medical record. 10. Click the Download Summary menu link to download a portable copy of your medical information. If you have questions, please visit the Frequently Asked Questions section of the [employer] website. Remember, [employer] is NOT to be used for urgent needs. For medical emergencies, dial 911. Now available from your iPhone and Android! Providers Seen During Your Hospitalization Provider Specialty Primary office phone Anais Peace MD Obstetrics & Gynecology [phone] Your Primary Care Physician (PCP) Primary Care Physician Office Phone Office Fax Shanti Baljitlouisa, [address] [place] [address] [phone] You are allergic to the following No active allergies Recent Documentation Height Weight Breastfeeding? BMI OB Status Smoking Status 1.676 m 63.5 kg No 22.6 kg/m2 Pregnant Never Smoker Emergency Contacts Name Discharge Info Relation Home Work Mobile [facility]  Spouse [3]   [phone] Patient Belongings  The following personal items are in your possession at time of discharge: 
  Dental Appliances: None  Visual Aid: None      Home Medications: None   Jewelry: Earrings, Ring  Clothing: Pants, Shirt, Undergarments, Jacket/Coat    Other Valuables: [employer] Please provide this summary of care documentation to your next provider. Signatures-by signing, you are acknowledging that this After Visit Summary has been reviewed with you and you have received a copy. Patient Signature:  ____________________________________________________________ Date:  ____________________________________________________________  
  
Erasmo November Provider Signature:  ____________________________________________________________ Date:  ____________________________________________________________

## 2018-01-07 NOTE — ANESTHESIA POSTPROCEDURE EVALUATION
Post-Anesthesia Evaluation and Assessment    Patient: Antwan Weinberg MRN: [id number]  SSN: [id number]    YOB: 1985  Age: 33 y.o. Sex: female       Cardiovascular Function/Vital Signs  Visit Vitals    BP 96/53    Pulse 69    Temp 37.2 °C (98.9 °F)    Resp 20    Ht 5' 6\" (1.676 m)    Wt 63.5 kg (140 lb)    SpO2 100%    BMI 22.6 kg/m2       Patient is status post general anesthesia for Procedure(s):  DIAGNOSTIC LAPAROSCOPY FOR REMOVAL OF CLOT  . Nausea/Vomiting: None    Postoperative hydration reviewed and adequate. Pain:  Pain Scale 1: Visual (01/07/18 0350)  Pain Intensity 1: 0 (01/07/18 0350)   Managed    Neurological Status:   Neuro (WDL): Exceptions to WDL (01/07/18 0250)  Neuro  Neurologic State: Eyes open to voice (01/07/18 0350)  Cognition: Follows commands (01/07/18 0335)  LUE Motor Response: Purposeful (01/07/18 0335)  LLE Motor Response: Purposeful (01/07/18 0335)  RUE Motor Response: Purposeful (01/07/18 0335)  RLE Motor Response: Purposeful (01/07/18 0335)   At baseline    Mental Status and Level of Consciousness: Arousable    Pulmonary Status:   O2 Device: Nasal cannula (01/07/18 0335)   Adequate oxygenation and airway patent    Complications related to anesthesia: None    Post-anesthesia assessment completed.  No concerns    Signed By: Melecio Molina MD     January 7, 2018

## 2018-01-07 NOTE — PROGRESS NOTES
Unable to ID location of ectopic. Pt  6w2d by lmp 11-24  Quant 1895  Will observe in house: quants, hgb's, vs    D/w pt and  that she may need additional surgery if begins bleeding again. Would need laparotomy if that occurs.

## 2018-01-07 NOTE — PROGRESS NOTES
TRANSFER - IN REPORT:    Verbal report received from VIMAL alexander(name) on Ruthie Palmer  being received from PACU for routine post - op      Report consisted of patients Situation, Background, Assessment and   Recommendations(SBAR). Information from the following report(s) Kardex, OR Summary, Intake/Output and Recent Results was reviewed with the receiving nurse. Opportunity for questions and clarification was provided. Assessment completed upon patients arrival to unit and care assumed.

## 2018-01-07 NOTE — ED TRIAGE NOTES
Pt presents to ER via EMS from [facility] for Dr. Levi Meza to see due to ruptured ectopic pregnancy., Dr. Zhanna Ulrich and Dr. Levi Meza currently at [facility], [address] team present.

## 2018-01-07 NOTE — PROGRESS NOTES
Patient ate baked potato and salad. Tolerated well. Requests for pain medication 4/10 cramping pain in abdomen. PRN norco administered. 2500 clear yellow urine emptied from france catheter.

## 2018-01-07 NOTE — H&P
2018      DeWitt Cross  1985      32 y. o.[id number] seen at [facility] with early pregnancy (7wks by lmp) and abdominal pain which started around [facility] reported to me by Dr Ana Nobles as >3000. US shows no iup, FF in upper abdomen, mass in R adnexa. PMH:infertility, pelvic adhesions    PSH:laparoscopy with removal L tube      Current Facility-Administered Medications:     lactated Ringers infusion, 125 mL/hr, IntraVENous, CONTINUOUS, SAMUEL Allen MD    famotidine (PF) (PEPCID) injection 20 mg, 20 mg, IntraVENous, NOW, SAMUEL Allen MD    famotidine (PF) (PEPCID) 20 mg/2 mL injection, , , ,     Current Outpatient Prescriptions:     terconazole (TERAZOL 7) 0.4 % vaginal cream, Insert 1 Applicator into vagina nightly., Disp: 45 g, Rfl: 0    Family and social history noncontributory. Review of systems: pertinent items noted above     Visit Vitals    BP (!) 86/49 (BP 1 Location: Right arm, BP Patient Position: At rest;Supine)    Pulse 71    Temp 98.7 °F (37.1 °C)    Resp 16    Ht 5' 6\" (1.676 m)    Wt 144 lb (65.3 kg)    LMP 10/25/2017    SpO2 96%    BMI 23.24 kg/m2     Patient in no distress. Well developed, well nourished. A&O x3. HEENT: normocephalic, nontraumatic, sclerae nonicteric. Pt pale  Neck: supple, trachea midline  Chest: respiratory effort normal. Lungs clear to auscultation  CV: regular rate and rhythm. No murmur or gallop  Abdomen: soft, globally tender  Pelvic: not performed    Labs from [facility] reviewed. hgb earlier 11, plts nl    Imaging: see above    Assessment/Plan: ruptured ectopic pregnancy. Proceed with laparoscopy if possible. D/w pt she will lose R tube most likely. Reviewed the risks of surgery including anesthesia, bleeding, transfusion, infection,  injury to vessels/nerves/ureters/bowel/bladder.

## 2018-01-08 VITALS
OXYGEN SATURATION: 100 % | WEIGHT: 140 LBS | BODY MASS INDEX: 22.5 KG/M2 | DIASTOLIC BLOOD PRESSURE: 62 MMHG | RESPIRATION RATE: 16 BRPM | TEMPERATURE: 98.3 F | HEART RATE: 92 BPM | HEIGHT: 66 IN | SYSTOLIC BLOOD PRESSURE: 96 MMHG

## 2018-01-08 LAB
ABO + RH BLD: NORMAL
BLD PROD TYP BPU: NORMAL
BLOOD BANK CMNT PATIENT-IMP: NORMAL
BLOOD GROUP ANTIBODIES SERPL: NORMAL
BPU ID: NORMAL
CROSSMATCH RESULT,%XM: NORMAL
HCT VFR BLD AUTO: 28.4 % (ref 35.8–46.3)
HGB BLD-MCNC: 9.2 G/DL (ref 11.7–15.4)
SPECIMEN EXP DATE BLD: NORMAL
STATUS OF UNIT,%ST: NORMAL
UNIT DIVISION, %UDIV: 0

## 2018-01-08 PROCEDURE — 36415 COLL VENOUS BLD VENIPUNCTURE: CPT | Performed by: OBSTETRICS & GYNECOLOGY

## 2018-01-08 PROCEDURE — G0378 HOSPITAL OBSERVATION PER HR: HCPCS

## 2018-01-08 PROCEDURE — 74011250637 HC RX REV CODE- 250/637: Performed by: OBSTETRICS & GYNECOLOGY

## 2018-01-08 PROCEDURE — 85014 HEMATOCRIT: CPT | Performed by: OBSTETRICS & GYNECOLOGY

## 2018-01-08 PROCEDURE — 36430 TRANSFUSION BLD/BLD COMPNT: CPT

## 2018-01-08 PROCEDURE — 99218 HC RM OBSERVATION: CPT

## 2018-01-08 PROCEDURE — 86900 BLOOD TYPING SEROLOGIC ABO: CPT | Performed by: OBSTETRICS & GYNECOLOGY

## 2018-01-08 PROCEDURE — 86923 COMPATIBILITY TEST ELECTRIC: CPT | Performed by: OBSTETRICS & GYNECOLOGY

## 2018-01-08 PROCEDURE — P9016 RBC LEUKOCYTES REDUCED: HCPCS | Performed by: OBSTETRICS & GYNECOLOGY

## 2018-01-08 RX ORDER — IBUPROFEN 800 MG/1
800 TABLET ORAL
Qty: 60 TAB | Refills: 0 | Status: SHIPPED | OUTPATIENT
Start: 2018-01-08 | End: 2018-07-12

## 2018-01-08 RX ORDER — HYDROCODONE BITARTRATE AND ACETAMINOPHEN 7.5; 325 MG/1; MG/1
1-2 TABLET ORAL
Qty: 30 TAB | Refills: 0 | Status: SHIPPED | OUTPATIENT
Start: 2018-01-08 | End: 2018-01-11

## 2018-01-08 RX ORDER — SODIUM CHLORIDE 9 MG/ML
250 INJECTION, SOLUTION INTRAVENOUS AS NEEDED
Status: DISCONTINUED | OUTPATIENT
Start: 2018-01-08 | End: 2018-01-08 | Stop reason: HOSPADM

## 2018-01-08 RX ADMIN — HYDROCODONE BITARTRATE AND ACETAMINOPHEN 1 TABLET: 7.5; 325 TABLET ORAL at 13:53

## 2018-01-08 RX ADMIN — HYDROCODONE BITARTRATE AND ACETAMINOPHEN 1 TABLET: 7.5; 325 TABLET ORAL at 06:08

## 2018-01-08 NOTE — PROGRESS NOTES
Awake,alert,,pain level=1-2/10 as stated,abdomen soft but tender to touch,,has three bandaid dressings noted,,intact,clean and dry,,second unit PRBC infusing well so far on left AC space site,,vital signs monitored. Tani Jews Tani Jews Tani Jews Tani Jews

## 2018-01-08 NOTE — PROGRESS NOTES
OBG/GYN Generic Progress Note    Patient doing well. Tolerating reg diet. Feels well. Vitals:  Blood pressure 90/50, pulse 95, temperature 98.7 °F (37.1 °C), resp. rate 18, height 5' 6\" (1.676 m), weight 140 lb (63.5 kg), last menstrual period 10/25/2017, SpO2 100 %, not currently breastfeeding. Temp (24hrs), Av.8 °F (37.1 °C), Min:97.8 °F (36.6 °C), Max:99.5 °F (37.5 °C)        Exam:  Patient without distress. Abdomen soft,  nontender. Incisions dry and clean without erythema. Labs:   Recent Results (from the past 24 hour(s))   CBC WITH AUTOMATED DIFF    Collection Time: 18  1:04 AM   Result Value Ref Range    WBC 11.8 (H) 4.3 - 11.1 K/uL    RBC 3.23 (L) 4.05 - 5.25 M/uL    HGB 8.4 (L) 11.7 - 15.4 g/dL    HCT 26.1 (L) 35.8 - 46.3 %    MCV 80.8 79.6 - 97.8 FL    MCH 26.0 (L) 26.1 - 32.9 PG    MCHC 32.2 31.4 - 35.0 g/dL    RDW 13.7 11.9 - 14.6 %    PLATELET 306 443 - 413 K/uL    MPV 9.5 (L) 10.8 - 14.1 FL    DF AUTOMATED      NEUTROPHILS 94 (H) 43 - 78 %    LYMPHOCYTES 4 (L) 13 - 44 %    MONOCYTES 2 (L) 4.0 - 12.0 %    EOSINOPHILS 0 (L) 0.5 - 7.8 %    BASOPHILS 0 0.0 - 2.0 %    IMMATURE GRANULOCYTES 0 0.0 - 5.0 %    ABS. NEUTROPHILS 11.1 (H) 1.7 - 8.2 K/UL    ABS. LYMPHOCYTES 0.5 0.5 - 4.6 K/UL    ABS. MONOCYTES 0.2 0.1 - 1.3 K/UL    ABS. EOSINOPHILS 0.0 0.0 - 0.8 K/UL    ABS. BASOPHILS 0.0 0.0 - 0.2 K/UL    ABS. IMM.  GRANS. 0.0 0.0 - 0.5 K/UL   TYPE + CROSSMATCH    Collection Time: 18  1:04 AM   Result Value Ref Range    Crossmatch Expiration 01/10/2018     ABO/Rh(D) O POSITIVE     Antibody screen NEG     Unit number [id number]     Blood component type [facility] AS5     Unit division 00     Status of unit ALLOCATED     Crossmatch result Compatible     Unit number [id number]     Blood component type [facility] AS5     Unit division 00     Status of unit ALLOCATED     Crossmatch result Compatible    HEMOGLOBIN    Collection Time: 01/07/18  3:14 AM   Result Value Ref Range    HGB 8.3 (L) 11.7 - 15.4 g/dL   BETA HCG, QT    Collection Time: 01/07/18  3:14 AM   Result Value Ref Range    Beta HCG, QT 3923 (H) 0.0 - 6.0 MIU/ML   BETA HCG, QT    Collection Time: 01/07/18  7:42 AM   Result Value Ref Range    Beta HCG, QT 2088 (H) 0.0 - 6.0 MIU/ML   CBC W/O DIFF    Collection Time: 01/07/18  4:20 PM   Result Value Ref Range    WBC 10.3 4.3 - 11.1 K/uL    RBC 2.64 (L) 4.05 - 5.25 M/uL    HGB 6.9 (LL) 11.7 - 15.4 g/dL    HCT 21.6 (L) 35.8 - 46.3 %    MCV 81.8 79.6 - 97.8 FL    MCH 26.1 26.1 - 32.9 PG    MCHC 31.9 31.4 - 35.0 g/dL    RDW 14.3 11.9 - 14.6 %    PLATELET 375 340 - 866 K/uL    MPV 9.4 (L) 10.8 - 14.1 FL   BETA HCG, QT    Collection Time: 01/07/18  4:20 PM   Result Value Ref Range    Beta HCG, QT 1781 (H) 0.0 - 6.0 MIU/ML       Assessment and Plan:  Barron Pretty continues to drop. hgb now equilibrating <7. D/w pt poss blood transfusion. D/c france and ambulate and see how she tolerates the anemia. D/w the small risks transfusion vs the real risk of significant problems if pt has any viable poc in situ and she starts hemorrhaging again from now a hgb of 6.9. She will decide.

## 2018-01-08 NOTE — DISCHARGE INSTRUCTIONS
DISCHARGE SUMMARY from Nurse    The following personal items are in your possession at time of discharge:    Dental Appliances: None  Visual Aid: None     Home Medications: None  Jewelry: Earrings, Ring  Clothing: Pants, Shirt, Undergarments, Jacket/Coat  Other Valuables: Cell Phone             PATIENT INSTRUCTIONS:    After general anesthesia or intravenous sedation, for 24 hours or while taking prescription Narcotics:  · Limit your activities  · Do not drive and operate hazardous machinery  · Do not make important personal or business decisions  · Do  not drink alcoholic beverages  · If you have not urinated within 8 hours after discharge, please contact your surgeon on call. Report the following to your surgeon:  · Excessive pain, swelling, redness or odor of or around the surgical area  · Temperature over 100.5  · Nausea and vomiting lasting longer than 4 hours or if unable to take medications  · Any signs of decreased circulation or nerve impairment to extremity: change in color, persistent  numbness, tingling, coldness or increase pain  · Any questions        What to do at Home:  Recommended activity: Activity as tolerated, per MD    If you experience any of the following symptoms fever>101, pain unrelieved with medication, nausea/vomiting, shortness of breath, dizziness/fainting, chest pain. , please follow up with your doctor. *  Please give a list of your current medications to your Primary Care Provider. *  Please update this list whenever your medications are discontinued, doses are      changed, or new medications (including over-the-counter products) are added. *  Please carry medication information at all times in case of emergency situations. These are general instructions for a healthy lifestyle:    No smoking/ No tobacco products/ Avoid exposure to second hand smoke    Surgeon General's Warning:  Quitting smoking now greatly reduces serious risk to your health.     Obesity, smoking, and sedentary lifestyle greatly increases your risk for illness    A healthy diet, regular physical exercise & weight monitoring are important for maintaining a healthy lifestyle    You may be retaining fluid if you have a history of heart failure or if you experience any of the following symptoms:  Weight gain of 3 pounds or more overnight or 5 pounds in a week, increased swelling in our hands or feet or shortness of breath while lying flat in bed. Please call your doctor as soon as you notice any of these symptoms; do not wait until your next office visit. Recognize signs and symptoms of STROKE:    F-face looks uneven    A-arms unable to move or move unevenly    S-speech slurred or non-existent    T-time-call 911 as soon as signs and symptoms begin-DO NOT go       Back to bed or wait to see if you get better-TIME IS BRAIN. Warning Signs of HEART ATTACK     Call 911 if you have these symptoms:   Chest discomfort. Most heart attacks involve discomfort in the center of the chest that lasts more than a few minutes, or that goes away and comes back. It can feel like uncomfortable pressure, squeezing, fullness, or pain.  Discomfort in other areas of the upper body. Symptoms can include pain or discomfort in one or both arms, the back, neck, jaw, or stomach.  Shortness of breath with or without chest discomfort.  Other signs may include breaking out in a cold sweat, nausea, or lightheadedness. Don't wait more than five minutes to call 911 - MINUTES MATTER! Fast action can save your life. Calling 911 is almost always the fastest way to get lifesaving treatment. Emergency Medical Services staff can begin treatment when they arrive -- up to an hour sooner than if someone gets to the hospital by car. The discharge information has been reviewed with the patient. The patient verbalized understanding.     Discharge medications reviewed with the patient and appropriate educational materials and side effects teaching were provided. Ectopic Pregnancy: Care Instructions  Your Care Instructions  An ectopic pregnancy occurs when a fertilized egg grows outside of the uterus. In a normal pregnancy, the fertilized egg grows inside the uterus. In most ectopic pregnancies, the egg grows in a fallopian tube. This is also called a tubal pregnancy. Sometimes the egg grows in an ovary or another place in the belly. But this is rare. An ectopic pregnancy never becomes a normal pregnancy and birth. You had treatment to end your ectopic pregnancy. This was done to prevent dangerous problems. You may need a few weeks to recover if you had surgery. You should be able to have a normal pregnancy in the future. But you may have a higher risk for more ectopic pregnancies. Tell your doctor right away if you get pregnant again. Follow-up care is a key part of your treatment and safety. Be sure to make and go to all appointments, and call your doctor if you are having problems. It's also a good idea to know your test results and keep a list of the medicines you take. How can you care for yourself at home? · After your treatment, you may have vaginal bleeding that's similar to a period. It may last for up to a week. Use pads instead of tampons. You may use tampons during your next period. It should start in 3 to 6 weeks. · Do not have sex until after the bleeding stops. · If you are treated with methotrexate:  ¨ Your doctor will let you know if you can take over-the-counter pain medicine, such as acetaminophen (Tylenol), ibuprofen (Advil, Motrin), or naproxen (Aleve). Read and follow all instructions on the label. ¨ Do not take two or more pain medicines at the same time unless the doctor told you to. Many pain medicines have acetaminophen, which is Tylenol. Too much acetaminophen (Tylenol) can be harmful. ¨ Do not drink alcohol. ¨ Do not take vitamins that contain folic acid, such as prenatal vitamins.   · Get plenty of rest. You may be more tired than normal for a few weeks. · Take it easy and avoid lifting until your doctor tells you it is safe to do your normal activities. · Give yourself and your partner time to grieve. You may have feelings of loss. You may wonder why it happened and blame yourself. ¨ Talking to family members, friends, or a counselor may help you cope with your loss. ¨ If you feel sad for longer than 2 weeks, tell your doctor or a counselor. · Talk to your doctor if you are worried about having children in the future. Most doctors suggest waiting until you have had at least one normal period before you try to get pregnant. · If you do not want to get pregnant, ask your doctor about birth control. You can get pregnant again before your next period starts. When should you call for help? Call 911 anytime you think you may need emergency care. For example, call if:  · You passed out (lost consciousness). Call your doctor now or seek immediate medical care if:  · You have severe vaginal bleeding. · You are dizzy or lightheaded, or you feel like you may faint. · You have a fever. · You have new or worse pain in your belly or pelvis. · You have vaginal discharge that smells bad. Watch closely for changes in your health, and be sure to contact your doctor if:  · You do not get better as expected. Where can you learn more? Go to http://roshan-madhuri.info/. Enter J774 in the search box to learn more about \"Ectopic Pregnancy: Care Instructions. \"  Current as of: March 16, 2017  Content Version: 11.4  © 3811-4529 [employer]. Care instructions adapted under license by [employer] (which disclaims liability or warranty for this information). If you have questions about a medical condition or this instruction, always ask your healthcare professional. [address] any warranty or liability for your use of this information.

## 2018-01-08 NOTE — PROGRESS NOTES
Second unit of PRBC all infused,vital signs checked,,no complaints of pain so far. .. [place] [place] [employer]

## 2018-01-08 NOTE — PROGRESS NOTES
Second unit of PRBC still infusing,, in to see patient,,orders made,,blood transfusion in progress,,no complaints of post-op.pain,,up to the bathroom for needs,,assisted by PCT and voided. ...

## 2018-01-08 NOTE — PROGRESS NOTES
Patient resting quietly, alert and oriented, no distress noted. Abdominal dressings x 3 c/d/i. IV fluids infusing as ordered @ 75 ml/hr, france cathetar has been removed for clear, yellow urine. Patient has been up ambulating with nurse and family in hallway as per doctor order, tolerating well. Neurovascular and peripheral vascular checks WNL. Bed low and locked position. Call light within reach. Patient instructed to call for assistance, verbalizes understanding. Nursing assessment complete.

## 2018-01-08 NOTE — PROGRESS NOTES
Discharge instructions and prescriptions provided and explained to the pt. Med side effect sheet reviewed. Opportunity for questions provided. Pt is finishing with her transfusion. Instructed to call once ready to leave.

## 2018-01-08 NOTE — PROGRESS NOTES
OBG/GYN Generic Progress Note    Patient had dizziness this morning ambulating, even after 1 unit rbc's transfused. Now getting 2nd unit. Pain controlled on current medication. Voiding without difficulty. Patient  passing Flatus. Vitals:  Blood pressure 95/42, pulse 71, temperature 98.2 °F (36.8 °C), resp. rate 16, height 5' 6\" (1.676 m), weight 140 lb (63.5 kg), last menstrual period 10/25/2017, SpO2 98 %, not currently breastfeeding. Temp (24hrs), Av.9 °F (37.2 °C), Min:97.7 °F (36.5 °C), Max:99.7 °F (37.6 °C)        Exam:  Patient without distress. Abdomen soft,  nontender. Incisions dry and clean without erythema. Labs:   Recent Results (from the past 24 hour(s))   CBC W/O DIFF    Collection Time: 18  4:20 PM   Result Value Ref Range    WBC 10.3 4.3 - 11.1 K/uL    RBC 2.64 (L) 4.05 - 5.25 M/uL    HGB 6.9 (LL) 11.7 - 15.4 g/dL    HCT 21.6 (L) 35.8 - 46.3 %    MCV 81.8 79.6 - 97.8 FL    MCH 26.1 26.1 - 32.9 PG    MCHC 31.9 31.4 - 35.0 g/dL    RDW 14.3 11.9 - 14.6 %    PLATELET 430 437 - 245 K/uL    MPV 9.4 (L) 10.8 - 14.1 FL   BETA HCG, QT    Collection Time: 18  4:20 PM   Result Value Ref Range    Beta HCG, QT 1781 (H) 0.0 - 6.0 MIU/ML   TYPE & SCREEN    Collection Time: 18  5:25 AM   Result Value Ref Range    Crossmatch Expiration 2018     ABO/Rh(D) Abbie Maldonado POSITIVE     Antibody screen NEG     Unit number [id number]     Blood component type RC LR AS5     Unit division 00     Status of unit ISSUED     Crossmatch result Compatible        Assessment and Plan:  D/c home after 2nd unit.  Will ck hgb after 2nd unit in

## 2018-01-08 NOTE — DISCHARGE SUMMARY
Discharge Summary     Name: Sharmon Boxer MRN: [id number]  SSN: [id number]    YOB: 1985  Age: 33 y.o. Sex: female      Admit Date: 1/7/2018    Discharge Date: 1/8/2018      Admitting Physician: Milli Louis MD     * Admission Diagnoses: Ectopic Pregnancy    * Discharge Diagnoses:   Hospital Problems as of 1/8/2018  Date Reviewed: 11/15/2017          Codes Class Noted - Resolved POA    Acute blood loss anemia ICD-10-CM: D62  ICD-9-CM: 285.1  1/7/2018 - Present Yes        Hemoperitoneum ICD-10-CM: K66.1  ICD-9-CM: 568.81  1/7/2018 - Present Yes        * (Principal)Ectopic pregnancy ICD-10-CM: O00.90  ICD-9-CM: 633.90  1/7/2018 - Present Yes               * Procedures: Laparoscopy    * Discharge Condition: [facility] Course: Normal hospital course for this procedure. Hemoperitoneum was evacuated. No ectopic was found, but quants dropped.  Pt required transfusion    Significant Diagnostic Studies:   Recent Results (from the past 24 hour(s))   CBC W/O DIFF    Collection Time: 01/07/18  4:20 PM   Result Value Ref Range    WBC 10.3 4.3 - 11.1 K/uL    RBC 2.64 (L) 4.05 - 5.25 M/uL    HGB 6.9 (LL) 11.7 - 15.4 g/dL    HCT 21.6 (L) 35.8 - 46.3 %    MCV 81.8 79.6 - 97.8 FL    MCH 26.1 26.1 - 32.9 PG    MCHC 31.9 31.4 - 35.0 g/dL    RDW 14.3 11.9 - 14.6 %    PLATELET 753 128 - 325 K/uL    MPV 9.4 (L) 10.8 - 14.1 FL   BETA HCG, QT    Collection Time: 01/07/18  4:20 PM   Result Value Ref Range    Beta HCG, QT 1781 (H) 0.0 - 6.0 MIU/ML   TYPE & SCREEN    Collection Time: 01/08/18  5:25 AM   Result Value Ref Range    Crossmatch Expiration 01/11/2018     ABO/Rh(D) Mohan Andersons POSITIVE     Antibody screen NEG     Unit number [id number]     Blood component type RC LR AS5     Unit division 00     Status of unit ISSUED     Crossmatch result Compatible        * Disposition: Home    Discharge Medications:   Current Discharge Medication List      START taking these medications    Details HYDROcodone-acetaminophen (NORCO) 7.5-325 mg per tablet Take 1-2 Tabs by mouth every six (6) hours as needed. Max Daily Amount: 8 Tabs. Qty: 30 Tab, Refills: 0    Associated Diagnoses: Status post laparoscopy      ibuprofen (MOTRIN) 800 mg tablet Take 1 Tab by mouth every eight (8) hours as needed. Qty: 60 Tab, Refills: 0         CONTINUE these medications which have NOT CHANGED    Details   terconazole (TERAZOL 7) 0.4 % vaginal cream Insert 1 Applicator into vagina nightly. Qty: 45 g, Refills: 0              * Follow-up Care/Patient Instructions: Activity: No sex, douching, or tampons for 6 weeks or as directed by your physician. No heavy lifting for 6 weeks. No driving while taking pain medication.   Diet: Resume pre-hospital diet  Wound Care: Keep wound clean and dry    Follow-up Information     Follow up With Details Comments [facility] MD Tony   [address] [place] [id number] [phone]             Signed By:  Kiana Moyer MD     January 8, 2018

## 2018-01-09 LAB
ABO + RH BLD: NORMAL
BLD PROD TYP BPU: NORMAL
BLOOD GROUP ANTIBODIES SERPL: NORMAL
BPU ID: NORMAL
CROSSMATCH RESULT,%XM: NORMAL
SPECIMEN EXP DATE BLD: NORMAL
STATUS OF UNIT,%ST: NORMAL
UNIT DIVISION, %UDIV: 0

## 2018-01-09 NOTE — PROGRESS NOTES
Up and about in room,,was given one tablet Norco (7.5 mgs. ),prn for pain,,Hgb.level post post PRBC transfusion=9.2,, IV access (x2) taken out both AC spce,,abdominal dressing intact,clean and dry X3 bandaids. ...... Ivan Cohens

## 2018-01-09 NOTE — PROGRESS NOTES
Discharged per wheel chair,,stable condition,,accompanied by her ,,discharge instructions given by Med-Surg. RN with prescription given to patient. ...... Clara Flannery

## 2018-08-15 NOTE — PROGRESS NOTES
OBG/GYN Generic Progress Note    Patient doing well DOS laparoscopy with evacuation of blood/clots. Unable to ID the ectopic pregnancy. No significant complaints. Pain controlled on current medication. C/o thirst and hunger    Vitals:  Blood pressure 93/52, pulse 91, temperature 99.5 °F (37.5 °C), resp. rate 18, height 5' 6\" (1.676 m), weight 140 lb (63.5 kg), last menstrual period 10/25/2017, SpO2 99 %, not currently breastfeeding. Temp (24hrs), Av.8 °F (37.1 °C), Min:97.8 °F (36.6 °C), Max:99.5 °F (37.5 °C)        Exam:  Patient without distress. Abdomen soft,  nontender. Incisions have dry bandaids in place               Colin bag with ~400cc clear urine          Labs:   Recent Results (from the past 24 hour(s))   CBC WITH AUTOMATED DIFF    Collection Time: 18  1:04 AM   Result Value Ref Range    WBC 11.8 (H) 4.3 - 11.1 K/uL    RBC 3.23 (L) 4.05 - 5.25 M/uL    HGB 8.4 (L) 11.7 - 15.4 g/dL    HCT 26.1 (L) 35.8 - 46.3 %    MCV 80.8 79.6 - 97.8 FL    MCH 26.0 (L) 26.1 - 32.9 PG    MCHC 32.2 31.4 - 35.0 g/dL    RDW 13.7 11.9 - 14.6 %    PLATELET 389 073 - 494 K/uL    MPV 9.5 (L) 10.8 - 14.1 FL    DF AUTOMATED      NEUTROPHILS 94 (H) 43 - 78 %    LYMPHOCYTES 4 (L) 13 - 44 %    MONOCYTES 2 (L) 4.0 - 12.0 %    EOSINOPHILS 0 (L) 0.5 - 7.8 %    BASOPHILS 0 0.0 - 2.0 %    IMMATURE GRANULOCYTES 0 0.0 - 5.0 %    ABS. NEUTROPHILS 11.1 (H) 1.7 - 8.2 K/UL    ABS. LYMPHOCYTES 0.5 0.5 - 4.6 K/UL    ABS. MONOCYTES 0.2 0.1 - 1.3 K/UL    ABS. EOSINOPHILS 0.0 0.0 - 0.8 K/UL    ABS. BASOPHILS 0.0 0.0 - 0.2 K/UL    ABS. IMM.  GRANS. 0.0 0.0 - 0.5 K/UL   TYPE + CROSSMATCH    Collection Time: 18  1:04 AM   Result Value Ref Range    Crossmatch Expiration 01/10/2018     ABO/Rh(D) O POSITIVE     Antibody screen NEG     Unit number [id number]     Blood component type RC LR AS5     Unit division 00     Status of unit ALLOCATED     Crossmatch result Compatible Unit number [id number]     Blood component type RC LR AS5     Unit division 00     Status of unit ALLOCATED     Crossmatch result Compatible    HEMOGLOBIN    Collection Time: 01/07/18  3:14 AM   Result Value Ref Range    HGB 8.3 (L) 11.7 - 15.4 g/dL   BETA HCG, QT    Collection Time: 01/07/18  3:14 AM   Result Value Ref Range    Beta HCG, QT 3923 (H) 0.0 - 6.0 MIU/ML   BETA HCG, QT    Collection Time: 01/07/18  7:42 AM   Result Value Ref Range    Beta HCG, QT 2088 (H) 0.0 - 6.0 MIU/ML       Assessment and Plan:  Patient doing great. Barron [facility] has dropped 1000 pts. Will let her eat. Matti hgb and quant later today for assessment of hemodynamic stability. I don't expect much change now in quant, but will give mtx if it rises significantly. IV discontinued, cath removed intact

## 2022-03-18 PROBLEM — O00.90 ECTOPIC PREGNANCY: Status: ACTIVE | Noted: 2018-01-07

## 2022-03-19 PROBLEM — D62 ACUTE BLOOD LOSS ANEMIA: Status: ACTIVE | Noted: 2018-01-07

## 2022-03-20 PROBLEM — K66.1 HEMOPERITONEUM: Status: ACTIVE | Noted: 2018-01-07

## 2024-09-30 PROBLEM — O00.90 ECTOPIC PREGNANCY: Status: RESOLVED | Noted: 2018-01-07 | Resolved: 2024-09-30

## 2024-09-30 PROBLEM — D62 ACUTE BLOOD LOSS ANEMIA: Status: RESOLVED | Noted: 2018-01-07 | Resolved: 2024-09-30

## 2024-09-30 PROBLEM — K66.1 HEMOPERITONEUM: Status: RESOLVED | Noted: 2018-01-07 | Resolved: 2024-09-30

## 2025-02-17 NOTE — PROGRESS NOTES
2/19/2025    Lilli Smith  1985      PCP: No, Pcp  Patient does not see them for regular preventative visits.      HPI: 39 y.o. year old No obstetric history on file. Here for annual gyn wellness exam.   Last seen over 3yrs ago  BC = none.   Oldest is 13  Has an adopted 7 yo son    Patient's last menstrual period was 02/03/2025 (exact date).    Social History     Socioeconomic History    Marital status:      Spouse name: None    Number of children: None    Years of education: None    Highest education level: None   Tobacco Use    Smoking status: Never    Smokeless tobacco: Never   Substance and Sexual Activity    Alcohol use: No    Drug use: No     Social Determinants of Health     Food Insecurity: No Food Insecurity (2/19/2025)    Hunger Vital Sign     Worried About Running Out of Food in the Last Year: Never true     Ran Out of Food in the Last Year: Never true   Transportation Needs: No Transportation Needs (2/19/2025)    PRAPARE - Transportation     Lack of Transportation (Medical): No     Lack of Transportation (Non-Medical): No   Housing Stability: Low Risk  (2/19/2025)    Housing Stability Vital Sign     Unable to Pay for Housing in the Last Year: No     Number of Times Moved in the Last Year: 0     Homeless in the Last Year: No     Last pap - neg cotesting 8-2020. No hx abnl paps.  Lipids- needs  Gardasil - has been counseled  Hx GDM: no      Allergies, medications, past medical and surgical history, social history, family history all reviewed.       Review of Systems      Constitutional:  Denies unexplained weight loss/gain or heat/ cold intolerance/loss of balance  ENT: Denies blurred vision, loss of hearing, hoarseness  Cardiovascular:  Denies chest pain, swelling in legs or feet, shortness of breath when lying flat  Respiratory:  Denies shortness of breath, cough greater than 2 weeks or coughing up blood  Gastro: Denies diarrhea greater than 2 weeks, rectal bleeding, bloody stools,

## 2025-02-19 ENCOUNTER — OFFICE VISIT (OUTPATIENT)
Dept: OBGYN CLINIC | Age: 40
End: 2025-02-19
Payer: COMMERCIAL

## 2025-02-19 VITALS
DIASTOLIC BLOOD PRESSURE: 50 MMHG | WEIGHT: 148 LBS | SYSTOLIC BLOOD PRESSURE: 102 MMHG | BODY MASS INDEX: 23.23 KG/M2 | HEIGHT: 67 IN

## 2025-02-19 DIAGNOSIS — Z30.09 FAMILY PLANNING COUNSELING: ICD-10-CM

## 2025-02-19 DIAGNOSIS — Z13.1 SCREENING FOR DIABETES MELLITUS: ICD-10-CM

## 2025-02-19 DIAGNOSIS — Z11.51 SCREENING FOR HUMAN PAPILLOMAVIRUS (HPV): ICD-10-CM

## 2025-02-19 DIAGNOSIS — Z12.4 SCREENING FOR CERVICAL CANCER: ICD-10-CM

## 2025-02-19 DIAGNOSIS — Z13.220 SCREENING FOR HYPERLIPIDEMIA: ICD-10-CM

## 2025-02-19 DIAGNOSIS — Z01.419 WELL WOMAN EXAM: Primary | ICD-10-CM

## 2025-02-19 PROCEDURE — 99459 PELVIC EXAMINATION: CPT | Performed by: OBSTETRICS & GYNECOLOGY

## 2025-02-19 PROCEDURE — 99385 PREV VISIT NEW AGE 18-39: CPT | Performed by: OBSTETRICS & GYNECOLOGY

## 2025-02-19 SDOH — ECONOMIC STABILITY: FOOD INSECURITY: WITHIN THE PAST 12 MONTHS, YOU WORRIED THAT YOUR FOOD WOULD RUN OUT BEFORE YOU GOT MONEY TO BUY MORE.: NEVER TRUE

## 2025-02-19 SDOH — ECONOMIC STABILITY: FOOD INSECURITY: WITHIN THE PAST 12 MONTHS, THE FOOD YOU BOUGHT JUST DIDN'T LAST AND YOU DIDN'T HAVE MONEY TO GET MORE.: NEVER TRUE

## 2025-02-19 ASSESSMENT — PATIENT HEALTH QUESTIONNAIRE - PHQ9
SUM OF ALL RESPONSES TO PHQ QUESTIONS 1-9: 0
SUM OF ALL RESPONSES TO PHQ9 QUESTIONS 1 & 2: 0
SUM OF ALL RESPONSES TO PHQ QUESTIONS 1-9: 0
2. FEELING DOWN, DEPRESSED OR HOPELESS: NOT AT ALL
SUM OF ALL RESPONSES TO PHQ QUESTIONS 1-9: 0
1. LITTLE INTEREST OR PLEASURE IN DOING THINGS: NOT AT ALL
SUM OF ALL RESPONSES TO PHQ QUESTIONS 1-9: 0

## 2025-02-25 ENCOUNTER — LAB (OUTPATIENT)
Dept: OBGYN CLINIC | Age: 40
End: 2025-02-25

## 2025-02-25 DIAGNOSIS — Z13.220 SCREENING FOR HYPERLIPIDEMIA: ICD-10-CM

## 2025-02-25 DIAGNOSIS — Z13.1 SCREENING FOR DIABETES MELLITUS: ICD-10-CM

## 2025-02-25 LAB
CHOLEST SERPL-MCNC: 131 MG/DL (ref 0–200)
EST. AVERAGE GLUCOSE BLD GHB EST-MCNC: 108 MG/DL
GLUCOSE P FAST SERPL-MCNC: 83 MG/DL (ref 70–99)
HBA1C MFR BLD: 5.4 % (ref 0–5.6)
HDLC SERPL-MCNC: 64 MG/DL (ref 40–60)
HDLC SERPL: 2.1 (ref 0–5)
LDLC SERPL CALC-MCNC: 58 MG/DL (ref 0–100)
TRIGL SERPL-MCNC: 45 MG/DL (ref 0–150)
VLDLC SERPL CALC-MCNC: 9 MG/DL (ref 6–23)

## 2025-02-28 LAB
COLLECTION METHOD: NORMAL
CYTOLOGIST CVX/VAG CYTO: NORMAL
CYTOLOGY CVX/VAG DOC THIN PREP: NORMAL
DATE OF LMP: NORMAL
HPV APTIMA: NEGATIVE
HPV GENOTYPE REFLEX: NORMAL
Lab: NORMAL
OTHER PT INFO: NORMAL
PAP SOURCE: NORMAL
PATH REPORT.FINAL DX SPEC: NORMAL
PATHOLOGIST CVX/VAG CYTO: NORMAL
PREV CYTO INFO: NORMAL
PREV TREATMENT RESULTS: NORMAL
PREV TREATMENT: NORMAL
STAT OF ADQ CVX/VAG CYTO-IMP: NORMAL

## (undated) DEVICE — (D)PREP SKN CHLRAPRP APPL 26ML -- CONVERT TO ITEM 371833

## (undated) DEVICE — PERI-PAD,MODERATE: Brand: CURITY

## (undated) DEVICE — TRAY CATH 16F DRN BG LTX -- CONVERT TO ITEM 363158

## (undated) DEVICE — BLADELESS OPTICAL TROCAR WITH FIXATION CANNULA: Brand: VERSAPORT

## (undated) DEVICE — DERMABOND SKIN ADH 0.7ML -- DERMABOND ADVANCED 12/BX

## (undated) DEVICE — SOLUTION IV 1000ML 0.9% SOD CHL

## (undated) DEVICE — CARDINAL HEALTH FLEXIBLE LIGHT HANDLE COVER: Brand: CARDINAL HEALTH

## (undated) DEVICE — TRAY PREP DRY W/ PREM GLV 2 APPL 6 SPNG 2 UNDPD 1 OVERWRAP

## (undated) DEVICE — INSUFFLATION NEEDLE: Brand: SURGINEEDLE

## (undated) DEVICE — Device

## (undated) DEVICE — SOLUTION IRRIG 3000ML 0.9% SOD CHL FLX CONT 0797208] ICU MEDICAL INC]

## (undated) DEVICE — 2000CC GUARDIAN II: Brand: GUARDIAN

## (undated) DEVICE — Z DUPLICATE USE 2847003 INJECTOR UTER

## (undated) DEVICE — REM POLYHESIVE ADULT PATIENT RETURN ELECTRODE: Brand: VALLEYLAB

## (undated) DEVICE — CONTAINER SPEC FRMLN 120ML --

## (undated) DEVICE — [HIGH FLOW INSUFFLATOR,  DO NOT USE IF PACKAGE IS DAMAGED,  KEEP DRY,  KEEP AWAY FROM SUNLIGHT,  PROTECT FROM HEAT AND RADIOACTIVE SOURCES.]: Brand: PNEUMOSURE

## (undated) DEVICE — BLADED TROCAR WITH FIXATION CANNULA: Brand: VERSAONE

## (undated) DEVICE — KENDALL SCD EXPRESS SLEEVES, KNEE LENGTH, MEDIUM: Brand: KENDALL SCD

## (undated) DEVICE — 2, DISPOSABLE SUCTION/IRRIGATOR WITHOUT DISPOSABLE TIP: Brand: STRYKEFLOW

## (undated) DEVICE — SOL ANTI-FOG 6ML MEDC -- MEDICHOICE - CONVERT TO 358427